# Patient Record
Sex: MALE | Race: OTHER | Employment: OTHER | URBAN - METROPOLITAN AREA
[De-identification: names, ages, dates, MRNs, and addresses within clinical notes are randomized per-mention and may not be internally consistent; named-entity substitution may affect disease eponyms.]

---

## 2019-07-08 NOTE — PATIENT DISCUSSION
Mature/ Brunescent Surgery Counseling: I have discussed the option of updating the glasses prescription versus scheduling cataract surgery versus following. It was explained that when vision no longer meets the patient's visual needs and a new prescription for glasses is not likely to satisfy the patient, the option of cataract surgery is a reasonable next step. It was explained that there is no guarantee that removing the cataract will improve their visual symptoms, however; it is believed that the cataract is contributing to the patient's visual impairment and surgery may significantly improve both the visual and functional status of the patient. The risks, benefits and alternatives of surgery were discussed with the patient. I further explained to the patient that the maturity and density of his/her cataract increases the potential risks of a problem and may also result in a longer healing time than usual. After this discussion, the patient desires to proceed with cataract surgery with implantation of an intraocular lens to improve vision.

## 2019-07-08 NOTE — PATIENT DISCUSSION
MATURE CATARACT - THE PT WAS ADVISED HE MAY HAVE SOME SWELLING AFTER SURGERY DUE TO THE DENSITY OF THE CATARACT.

## 2019-07-08 NOTE — PATIENT DISCUSSION
REFRACTIVE ERROR, OU - DISCUSSED OPTION OF CORRECTING AT THE TIME OF CATARACT SURGERY. PT UNDERSTANDS AND ELECTS TO CONSIDER THEIR OPTIONS. THE PT DESIRES BEST DVA IN BOTH EYES AND UNDERSTANDS HE WILL NEED GLASSES FOR ALL NEAR AND INTERMEDIATE ACTIVITIES.

## 2019-07-25 NOTE — PATIENT DISCUSSION
S/P PC IOL, OD - MILD EDEMA, CENTRALLY LOCATED. PRESCRIBE NENA 128 QID. CONTINUE IMPRIMIS DROPS AS DIRECTED. RTC TUESDAY FOR STATUS CHECK.

## 2019-07-30 NOTE — PATIENT DISCUSSION
S/P PCIOL, OU - DOING WELL. CONTINUE USING DROPS AS DIRECTED. RETURN IN 2 WEEKS FOR SUTURE REMOVAL IN RIGHT EYE.

## 2019-08-13 NOTE — PATIENT DISCUSSION
S/P PCIOL, OD - DOING WELL. STITCH REMOVED TODAY. PATIENT GIVEN BESIVANCE QID FOR 5 DAYS. OK TO DISCONTINUE IMPRIMIS.

## 2019-08-13 NOTE — PATIENT DISCUSSION
Stopped Today: Pred-Gati-Brom: Ophthalmic Drops: 1 drop three times a day as directed into affected eye

## 2020-07-06 NOTE — PATIENT DISCUSSION
BECKI/K SICCA, OU - PRESCRIBED ARTIFICIAL TEARS BID - QID, OU AND THE DAILY INTAKE OF OMEGA 3/FATTY ACIDS. CONSIDER OTHER TREATMENT OPTIONS IF SYMPTOMS PERSIST/WORSEN. FOLLOW.

## 2022-10-27 ENCOUNTER — EMERGENCY VISIT (OUTPATIENT)
Dept: URBAN - METROPOLITAN AREA CLINIC 46 | Facility: CLINIC | Age: 82
End: 2022-10-27

## 2022-10-27 DIAGNOSIS — H02.132: ICD-10-CM

## 2022-10-27 DIAGNOSIS — H01.003: ICD-10-CM

## 2022-10-27 DIAGNOSIS — H02.135: ICD-10-CM

## 2022-10-27 DIAGNOSIS — H16.223: ICD-10-CM

## 2022-10-27 DIAGNOSIS — H16.213: ICD-10-CM

## 2022-10-27 PROCEDURE — 92002 INTRM OPH EXAM NEW PATIENT: CPT

## 2022-10-27 RX ORDER — SUMATRIPTAN SUCCINATE 50 MG/1
1 TABLET ORAL
Start: 2022-10-27

## 2022-10-27 ASSESSMENT — VISUAL ACUITY
OD_SC: 20/60+2
OS_SC: 20/50+2

## 2022-10-27 ASSESSMENT — TONOMETRY
OS_IOP_MMHG: 18
OD_IOP_MMHG: 18